# Patient Record
Sex: MALE | Race: WHITE | Employment: UNEMPLOYED | URBAN - METROPOLITAN AREA
[De-identification: names, ages, dates, MRNs, and addresses within clinical notes are randomized per-mention and may not be internally consistent; named-entity substitution may affect disease eponyms.]

---

## 2020-01-31 ENCOUNTER — TELEPHONE (OUTPATIENT)
Dept: PALLIATIVE MEDICINE | Facility: CLINIC | Age: 55
End: 2020-01-31

## 2020-01-31 NOTE — TELEPHONE ENCOUNTER
Order received from [facility] Mack Aguilar PA-C  for L5-S1 TLESI. Order scanned to telephone encounter, routing to scheduling coordinators to contact patient.     Marielena OLIVER    [facility]

## 2020-02-03 NOTE — TELEPHONE ENCOUNTER
"Pre-screening Questions for Radiology Injections:    Injection to be done at which interventional clinic site? [facility]    Instruct patient to arrive as directed prior to the scheduled appointment time:    [place] minutes before      [place]: 30 minutes before; if IV needed 1 hour before     Dr. Link-no IV needed for Cervical RALPH; please instruct to arrive 30\" early    Procedure ordered by Mack Aguilar    Procedure ordered? L5-S1 TLESI      Transforaminal Cervical RALPH - no pain provider currently performing    What insurance would patient like us to bill for this procedure? Medicare/ [employer]      Worker's comp or MVA (motor vehicle accident) -Any injection DO NOT SCHEDULE and route to Chantal Umana.      [employer] insurance - For SI joint injections, DO NOT SCHEDULE and route Chantal Umana.       [employer] - Any injection besides hip/shoulder/knee joint DO NOT SCHEDULE and route to Chantal Dong. She will obtain PA and call pt back to schedule procedure or notify pt of denial.       [employer]-Route to Chantal for review      **BCBS- ALL need to be routed to Chantal for review if a PA is needed**      IF SCHEDULING IN [place] AND NEEDS A PA, IT IS OKAY TO SCHEDULE. [place] HANDLES THEIR OWN PA'S AFTER THE PATIENT IS SCHEDULED. PLEASE SCHEDULE AT LEAST 1 WEEK OUT SO A PA CAN BE OBTAINED.    Any chance of pregnancy? Not Applicable   If YES, do NOT schedule and route to RN pool    Is an  needed? No     Patient has a drive home? (mandatory) YES: Informed    Is patient taking any blood thinners (i.e. plavix, coumadin, jantoven, warfarin, heparin, pradaxa or dabigatran, etc)? No   If hold needed, do NOT schedule, route to RN pool     Is patient taking any aspirin products (includes Excedrin and Fiorinal)? No     If more than 325mg/day do NOT schedule; route to RN pool     For CERVICAL procedures, hold all aspirin products for 6 days.     Tell pt that if aspirin product is not held for 6 days, the " procedure WILL BE cancelled.      Does the patient have a bleeding or clotting disorder? No     If YES, okay to schedule AND route to RN nurse pool    For any patients with platelet count <100, must be forwarded to provider    Is patient diabetic?  No  If YES, instruct them to bring their glucometer.    Does patient have an active infection or treated for one within the past week? No     Is patient currently taking any antibiotics?  No     For patients on chronic, preventative, or prophylactic antibiotics, procedures may be scheduled.     For patients on antibiotics for active or recent infection:antibiotic course must have been completed for 4 days    Is patient currently taking any steroid medications? (i.e. Prednisone, Medrol)  No     For patients on steroid medications, course must have been completed for 4 days    Reviewed with patient:  If you are started on any steroids or antibiotics between now and your appointment, you must contact us because the procedure may need to be cancelled.  Yes    Is patient actively being treated for cancer or immunocompromised? No  If YES, do NOT schedule and route to RN pool     Are you able to get on and off an exam table with minimal or no assistance? Yes  If NO, do NOT schedule and route to RN pool    Are you able to roll over and lay on your stomach with minimal or no assistance? Yes  If NO, do NOT schedule and route to RN pool     Any allergies to contrast dye, iodine, shellfish, or numbing and steroid medications? No  If YES, route to RN pool AND add allergy information to appointment notes    Allergies: Zosyn; Codeine sulfate; and Tramadol      Has the patient had a flu shot or any other vaccinations within 7 days before or after the procedure.  No     Does patient have an MRI/CT?  YES: 2020  Check Procedure Scheduling Grid to see if required.      Was the MRI done within the last 3 years?  Yes    If yes, where was the MRI done i.e.[facility], [facility], [facility], [facility] etc? [facility]    If no, do not schedule and route to RN pool    If MRI was not done at [facility], [facility] or [facility] do NOT schedule and route to RN pool.      If pt has an imaging disc, the injection MAY be scheduled but pt has to bring disc to appt.     If they show up without the disc the injection cannot be done    Reminders (please tell patient if applicable):       Instructed pt to arrive 30 minutes early for IV start if required. (Check Procedure Scheduling Grid)  Not Applicable      If celiac plexus block, informed patient NPO for 6 hours and that it is okay to take medications with sips of water, especially blood pressure medications  Not Applicable         If this is for a cervical procedure, informed patient that aspirin needs to be held for 6 days.   Not Applicable      For all patients not having spinal cord stimulator (SCS) trials or radiofrequency ablations (RFAs), informed patient:    IV sedation is not provided for this procedure.  If you feel that an oral anti-anxiety medication is needed, you can discuss this further with your referring provider or primary care provider.  The Pain Clinic provider will discuss specifics of what the procedure includes at your appointment.  Most procedures last 10-20 minutes.  We use numbing medications to help with any discomfort during the procedure.  Not Applicable      Do not schedule procedures requiring IV placement in the first appointment of the day or first appointment after lunch. Do NOT schedule at 0745, 0815 or 1245.       For patients 85 or older we recommend having an adult stay w/ them for the remainder of the day.       Does the patient have any questions?  NO  Marielena Latham  [facility]

## 2020-02-06 ENCOUNTER — RADIOLOGY INJECTION OFFICE VISIT (OUTPATIENT)
Dept: PALLIATIVE MEDICINE | Facility: CLINIC | Age: 55
End: 2020-02-06
Payer: MEDICARE

## 2020-02-06 ENCOUNTER — HOSPITAL ENCOUNTER (OUTPATIENT)
Dept: RADIOLOGY | Facility: CLINIC | Age: 55
Discharge: HOME | End: 2020-02-06
Attending: PSYCHIATRY & NEUROLOGY | Admitting: PSYCHIATRY & NEUROLOGY
Payer: MEDICARE

## 2020-02-06 VITALS — DIASTOLIC BLOOD PRESSURE: 89 MMHG | HEART RATE: 72 BPM | RESPIRATION RATE: 16 BRPM | SYSTOLIC BLOOD PRESSURE: 141 MMHG

## 2020-02-06 DIAGNOSIS — M54.16 LUMBAR RADICULOPATHY: Primary | ICD-10-CM

## 2020-02-06 DIAGNOSIS — M54.16 LUMBAR RADICULOPATHY: ICD-10-CM

## 2020-02-06 PROCEDURE — 27210202 XR LUMBAR SACRAL TRANSFORAMINAL INJ RIGHT: Mod: TC

## 2020-02-06 PROCEDURE — 25500064 ZZH RX 255 OP 636: Performed by: PSYCHIATRY & NEUROLOGY

## 2020-02-06 PROCEDURE — 64483 NJX AA&/STRD TFRM EPI L/S 1: CPT | Mod: RT | Performed by: PSYCHIATRY & NEUROLOGY

## 2020-02-06 PROCEDURE — 25000128 H RX IP 250 OP 636: Performed by: PSYCHIATRY & NEUROLOGY

## 2020-02-06 RX ORDER — DEXAMETHASONE SODIUM PHOSPHATE 10 MG/ML
10 INJECTION, SOLUTION INTRAMUSCULAR; INTRAVENOUS ONCE
Status: COMPLETED | OUTPATIENT
Start: 2020-02-06 | End: 2020-02-06

## 2020-02-06 RX ORDER — LIDOCAINE HYDROCHLORIDE 10 MG/ML
5 INJECTION, SOLUTION EPIDURAL; INFILTRATION; INTRACAUDAL; PERINEURAL ONCE
Status: COMPLETED | OUTPATIENT
Start: 2020-02-06 | End: 2020-02-06

## 2020-02-06 RX ORDER — GADOBUTROL 604.72 MG/ML
0.1 INJECTION INTRAVENOUS ONCE
Status: DISCONTINUED | OUTPATIENT
Start: 2020-02-06 | End: 2020-02-06 | Stop reason: CLARIF

## 2020-02-06 RX ORDER — BUPIVACAINE HYDROCHLORIDE 5 MG/ML
10 INJECTION, SOLUTION PERINEURAL ONCE
Status: COMPLETED | OUTPATIENT
Start: 2020-02-06 | End: 2020-02-06

## 2020-02-06 RX ADMIN — LIDOCAINE HYDROCHLORIDE 5 ML: 10 INJECTION, SOLUTION EPIDURAL; INFILTRATION; INTRACAUDAL; PERINEURAL at 08:48

## 2020-02-06 RX ADMIN — BUPIVACAINE HYDROCHLORIDE 1.5 ML: 5 INJECTION, SOLUTION EPIDURAL; INTRACAUDAL at 08:49

## 2020-02-06 RX ADMIN — DEXAMETHASONE SODIUM PHOSPHATE 10 MG: 10 INJECTION, SOLUTION INTRAMUSCULAR; INTRAVENOUS at 08:49

## 2020-02-06 RX ADMIN — IOHEXOL 4 ML: 300 INJECTION, SOLUTION INTRAVENOUS at 08:49

## 2020-02-06 NOTE — PROGRESS NOTES
Pre procedure Diagnosis: lumbar radiculopathy   Post procedure Diagnosis: Same  Procedure performed: right L5-S1 transforaminal epidural steroid injection   Anesthesia: none  Complications: none  Operators: Lori Francisco MD     Indications:   Otilio Jolly is a 54 year old male was sent by JAIRO Lozano for epidural steroid injection.  They have a history of low back and bilateral leg pain and numbness.  Exam shows diffuse weakness, with giveway in the LE, decreased sensation to LT bilaterally and they have tried conservative treatment including medications.    MRI was done at [facility] on 1/22/20 which showed   CONCLUSION: Multilevel degenerative lumbar spondylosis, unchanged right laminotomy at L4-5 and L5-S1 and the following notable findings:  1. New/recurrent 3 mm right posterolateral L4-5 and unchanged 2 mm central to left paracentral L4-5 disc protrusions with subarticular encroachment upon the traversing right L5 nerve root, but no central stenosis.  2. Unchanged, mildly caudally extending 2 to 3 mm right paracentral L3-4 disc extrusion without central stenosis or traversing neural compression.  3. No acute fracture or spondylolysis, although edematous degenerative endplate changes are demonstrated ventrally at L4-5.  4. Chronic mild bilateral L4-5 foraminal narrowing.  5. No evidence of arachnoidal adhesive disease    Options/alternatives, benefits and risks were discussed with the patient including bleeding, infection, tissue trauma, numbness, weakness, paralysis, spinal cord injury, radiation exposure, headache and reaction to medications. Questions were answered to his satisfaction and he agrees to proceed. Voluntary informed consent was obtained and signed.     Vitals were reviewed: Yes  Allergies were reviewed:  Yes   Medications were reviewed:  Yes   Pre-procedure pain score: 8/10    Procedure:  After getting informed consent, patient was brought into the procedure suite and was placed in a prone  position on the procedure table.   A Pause for the Cause was performed.  Patient was prepped and draped in sterile fashion.   After identifying the right L5 neuroforamen, the C-arm was rotated to a right lateral oblique angle.  A total of 5ml of Lidocaine 1% was used to anesthetize the skin and the needle track at a skin entry site coaxial with the fluoroscopy beam, and overriding the superior aspect of the neuroforamen.  A 22 gauge 5 inch spinal needle was advanced under intermittent fluoroscopy until it entered the foramen superiorly.    The position was then inspected from anteroposterior and lateral views, and the needle adjusted appropriately.  A total of 4ml of Omnipaque-300 was injected, confirming appropriate position, with spread into the nerve root sheath and the epidural space, with no intravascular uptake. 6ml was wasted    1.5ml of 0.5% bupivacaine with 10mg of dexamethasone was injected.  The needle was flushed with lidocaine and removed.    During the procedure, there was a paresthesia. Associated with pressure, improved with slowing.  Hemostasis was achieved, the area was cleaned, and bandaids were placed when appropriate.  The patient tolerated the procedure well, and was taken to the recovery room.    Images were saved to PACS.    Post-procedure pain score: 2/10  Follow-up includes:   -f/u phone call in one week  -f/u with referring provider    Lori Francisco MD  [facility]

## 2020-02-06 NOTE — DISCHARGE INSTRUCTIONS
[facility]   Procedure Discharge Instructions    Today you saw:    Dr. Preeti Francisco      You had a:  Right L5/S1 transforaminal epidural steroid injection    Medications used:  Lidocaine   Bupivacaine   Dexamethasone Omnipaque        Be cautious when walking. Numbness and/or weakness in the lower extremities may occur for up to 6-8 hours after the procedure due to effect of the local anesthetic    Do not drive for 6 hours. The effect of the local anesthetic could slow your reflexes.     You may resume your regular activities after 24 hours    Avoid strenuous activity for the first 24 hours    You may shower, however avoid swimming, tub baths or hot tubs for 24 hours following your procedure    You may have a mild to moderate increase in pain for several days following the injection.    It may take up to 14 days for the steroid medication to start working although you may feel the effect as early as a few days after the procedure.       You may use ice packs for 10-15 minutes, 3 to 4 times a day at the injection site for comfort    Do not use heat to painful areas for 6 to 8 hours. This will give the local anesthetic time to wear off and prevent you from accidentally burning your skin.     Unless you have been directed to avoid the use of anti-inflammatory medications (NSAIDS), you may use medications such as ibuprofen, Aleve or Tylenol for pain control if needed.     If you were fasting, you may resume your normal diet and medications after the procedure    Possible side effects of steroids that you may experience include flushing, elevated blood pressure, increased appetite, mild headaches and restlessness.  All of these symptoms will get better with time.    If you experience any of the following, call the Pain Clinic during work hours (Mon-Friday 8-4:30 pm) at [phone] or the Provider Line after hours at [phone]:  -Fever over 100 degree F  -Swelling, bleeding, redness,  drainage, warmth at the injection site  -Progressive weakness or numbness in your legs  -Loss of bowel or bladder function  -Unusual headache that is not relieved by Tylenol or other pain reliever  -Unusual new onset of pain that is not improving

## 2020-02-06 NOTE — IP AVS SNAPSHOT
MRN:[id number]                      After Visit Summary   2/6/2020    Otilio Jolly    MRN: [id number]           Visit Information        Provider Department      2/6/2020  8:45 AM [place]           Review of your medicines      UNREVIEWED medicines. Ask your doctor about these medicines       Dose / Directions   albuterol 108 (90 Base) MCG/ACT inhaler  Commonly known as:  PROAIR HFA/PROVENTIL HFA/VENTOLIN HFA      Dose:  2 puff  Inhale 2 puffs into the lungs every 3 hours as needed for shortness of breath / dyspnea.  Quantity:  1 Inhaler  Refills:  0              Protect others around you: Learn how to safely use, store and throw away your medicines at www.disposemymeds.org.       Follow-ups after your visit       Your next 10 appointments already scheduled    Feb 06, 2020  8:45 AM CST  Radiology Injections with Preeti Francisco MD  [facility] [place] ([facility]) [address]  [address]  [facility] [address]  [phone]      Feb 06, 2020  8:45 AM CST  XR LUMBAR/SACRAL TRANSFOR INJ BILATERAL with TASHIA  [facility] ([facility]) [address] [address]  [phone]   How do I prepare for my exam? (Food and drink instructions)  No Food and Drink Restrictions.    How do I prepare for my exam? (Other instructions)  * If you take Coumadin (or any other blood thinners) you may need to stop taking them up to 5 days prior to the exam. Talk to your doctor before stopping.  * If you take Plavix, Ticlid, Pletal or Persantine, please ask your doctor if you should stop these medicines. You may need extra tests on the morning of your scan.  * If you take Xarelto (Rivaroxaban), you may need to stop taking it 24 hours before treatment. Talk to your doctor before stopping this medicine.    What should I wear: Wear comfortable clothes.    How long does the  exam take: Injections take about 30 to 45 minutes. Most people spend up to 2 hours in the clinic or hospital.    What should I bring: Please bring any scans or X-rays taken at other hospitals, if similar tests were done. Also bring a list of your medicines, including vitamins, minerals and over-the-counter drugs. It is safest to leave personal items at home.    Do I need a : Plan to have someone drive you home afterward.    What do I need to tell my doctor:  Tell your doctor in advance:  * If you are allergic to X-ray dye (contrast fluid).  * If you may be pregnant.    What should I do after the exam: You may have slight cramping during this exam. The cramps should go away afterward. You may have some spotting after the exam.    What is this test: A spinal shot is done in or near the spine. You may receive steroid medicine (to reduce swelling) or contrast fluid (dye that makes the area show up more clearly on X-rays).  A nerve root shot is a shot into the nerve near the spine. It may reduce inflammation near the nerve root or spinal cord and reduce pain in the arm or leg.    Who should I call with questions: If you have any questions, please call the Imaging Department where you will have your exam. Directions, parking instructions, and other information are available on our website, [email or web address].        Care Instructions       Further instructions from your care team       [facility]   Procedure Discharge Instructions    Today you saw:    Dr. Preeti Francisco      You had a:  Right L5/S1 transforaminal epidural steroid injection    Medications used:  Lidocaine   Bupivacaine   Dexamethasone Omnipaque        Be cautious when walking. Numbness and/or weakness in the lower extremities may occur for up to 6-8 hours after the procedure due to effect of the local anesthetic    Do not drive for 6 hours. The effect of the local anesthetic could slow your reflexes.     You may  resume your regular activities after 24 hours    Avoid strenuous activity for the first 24 hours    You may shower, however avoid swimming, tub baths or hot tubs for 24 hours following your procedure    You may have a mild to moderate increase in pain for several days following the injection.    It may take up to 14 days for the steroid medication to start working although you may feel the effect as early as a few days after the procedure.       You may use ice packs for 10-15 minutes, 3 to 4 times a day at the injection site for comfort    Do not use heat to painful areas for 6 to 8 hours. This will give the local anesthetic time to wear off and prevent you from accidentally burning your skin.     Unless you have been directed to avoid the use of anti-inflammatory medications (NSAIDS), you may use medications such as ibuprofen, Aleve or Tylenol for pain control if needed.     If you were fasting, you may resume your normal diet and medications after the procedure    Possible side effects of steroids that you may experience include flushing, elevated blood pressure, increased appetite, mild headaches and restlessness.  All of these symptoms will get better with time.    If you experience any of the following, call the Pain Clinic during work hours (Mon-Friday 8-4:30 pm) at [phone] or the Provider Line after hours at [phone]:  -Fever over 100 degree F  -Swelling, bleeding, redness, drainage, warmth at the injection site  -Progressive weakness or numbness in your legs  -Loss of bowel or bladder function  -Unusual headache that is not relieved by Tylenol or other pain reliever  -Unusual new onset of pain that is not improving          Additional Information About Your Visit       Care EveryWhere ID    This is your Care EveryWhere ID. This could be used by other organizations to access your [facility] medical records  [id number]       Your Vitals Were  Most recent update: 2/6/2020  8:24 AM    Blood Pressure    126/94      Pulse   80    Respirations   16            Primary Care Provider Office Phone # Fax #    Oliver Duane Semmler, [phone]      Equal Access to Services    FARHANJAMIE PHILIP : Hadvilma josh munson kwesio Socharmaineali, waaxda luqadaha, qaybta kaalmada rosada, jessie quiles maheshrosibel caro laprudenciocorin keyon. So [facility] [phone].    ATENCIÓN: Si habla español, tiene a butler disposición servicios gratuitos de asistencia lingüística. Llame al [phone].    We comply with applicable federal and state civil rights laws, including the [place] Human Rights Act. We do not discriminate on the basis of race, color, creed, Hoahaoism, national origin, marital status, age, disability, sex, sexual orientation, or gender identity.       Thank you!    Thank you for choosing [facility] for your care. Our goal is always to provide you with excellent care. Hearing back from our patients is one way we can continue to improve our services. Please take a few minutes to complete the written survey that you may receive in the mail after you visit with us. Thank you!            Medication List      ASK your doctor about these medications          Morning Afternoon Evening Bedtime As Needed    albuterol 108 (90 Base) MCG/ACT inhaler  Also known as:  PROAIR HFA/PROVENTIL HFA/VENTOLIN HFA  INSTRUCTIONS:  Inhale 2 puffs into the lungs every 3 hours as needed for shortness of breath / dyspnea.

## 2020-02-06 NOTE — Clinical Note
How Severe Are Your Spot(S)?: mild Please send to referring provider Hpi Title: Evaluation of Skin Lesions

## 2020-02-13 ENCOUNTER — TELEPHONE (OUTPATIENT)
Dept: RADIOLOGY | Facility: CLINIC | Age: 55
End: 2020-02-13

## 2020-02-13 NOTE — TELEPHONE ENCOUNTER
Patient had a right L5-S1 transforaminal epidural steroid injection on 2/6/20.  Called patient for an update.      Left message that we were calling for an update about how s/he was doing after the injection.  LM that if s/he has any problems or questions to call the clinic at [phone].

## 2020-06-08 ENCOUNTER — TRANSFERRED RECORDS (OUTPATIENT)
Dept: HEALTH INFORMATION MANAGEMENT | Facility: CLINIC | Age: 55
End: 2020-06-08

## 2020-06-12 ENCOUNTER — MEDICAL CORRESPONDENCE (OUTPATIENT)
Dept: HEALTH INFORMATION MANAGEMENT | Facility: CLINIC | Age: 55
End: 2020-06-12

## 2020-06-12 ENCOUNTER — TRANSFERRED RECORDS (OUTPATIENT)
Dept: HEALTH INFORMATION MANAGEMENT | Facility: CLINIC | Age: 55
End: 2020-06-12

## 2020-06-15 ENCOUNTER — TELEPHONE (OUTPATIENT)
Dept: PALLIATIVE MEDICINE | Facility: CLINIC | Age: 55
End: 2020-06-15

## 2020-06-15 NOTE — TELEPHONE ENCOUNTER
Order received from Mack Aguilar PA-C at [facility] [place] [facility] for Right L5-S1 Tranforaminal Epidural Steroid Injection. Order scanned to telephone encounter, routing to scheduling coordinators to contact patient.    **Fax sent back requesting recent office notes.    Marielena OLIVER    [facility]

## 2020-06-15 NOTE — TELEPHONE ENCOUNTER
Office notes and and MRI report received.    Marielena OLIVER    [facility]

## 2020-07-31 ENCOUNTER — APPOINTMENT (OUTPATIENT)
Dept: ULTRASOUND IMAGING | Facility: CLINIC | Age: 55
End: 2020-07-31
Attending: EMERGENCY MEDICINE
Payer: MEDICARE

## 2020-07-31 ENCOUNTER — HOSPITAL ENCOUNTER (EMERGENCY)
Facility: CLINIC | Age: 55
Discharge: HOME | End: 2020-07-31
Attending: EMERGENCY MEDICINE | Admitting: EMERGENCY MEDICINE
Payer: MEDICARE

## 2020-07-31 VITALS
HEART RATE: 92 BPM | WEIGHT: 225 LBS | TEMPERATURE: 97.5 F | DIASTOLIC BLOOD PRESSURE: 103 MMHG | SYSTOLIC BLOOD PRESSURE: 169 MMHG | RESPIRATION RATE: 16 BRPM | OXYGEN SATURATION: 99 % | BODY MASS INDEX: 29.69 KG/M2

## 2020-07-31 DIAGNOSIS — T81.31XA WOUND DISRUPTION, POST-OP, SKIN, INITIAL ENCOUNTER: ICD-10-CM

## 2020-07-31 DIAGNOSIS — T81.31XA POSTOPERATIVE WOUND DEHISCENCE, INITIAL ENCOUNTER: ICD-10-CM

## 2020-07-31 PROCEDURE — 99284 EMERGENCY DEPT VISIT MOD MDM: CPT | Mod: Z6 | Performed by: EMERGENCY MEDICINE

## 2020-07-31 PROCEDURE — 99284 EMERGENCY DEPT VISIT MOD MDM: CPT | Mod: 25 | Performed by: EMERGENCY MEDICINE

## 2020-07-31 PROCEDURE — 76705 ECHO EXAM OF ABDOMEN: CPT

## 2020-07-31 RX ORDER — CEPHALEXIN 500 MG/1
500 TABLET ORAL 2 TIMES DAILY
Qty: 10 TABLET | Refills: 0 | Status: SHIPPED | OUTPATIENT
Start: 2020-07-31 | End: 2020-08-05

## 2020-07-31 ASSESSMENT — ENCOUNTER SYMPTOMS
HEMATOLOGIC/LYMPHATIC NEGATIVE: 1
EYES NEGATIVE: 1
GASTROINTESTINAL NEGATIVE: 1
MUSCULOSKELETAL NEGATIVE: 1
CONSTITUTIONAL NEGATIVE: 1
CARDIOVASCULAR NEGATIVE: 1
ALLERGIC/IMMUNOLOGIC NEGATIVE: 1
PSYCHIATRIC NEGATIVE: 1
ENDOCRINE NEGATIVE: 1
WOUND: 1
RESPIRATORY NEGATIVE: 1
NEUROLOGICAL NEGATIVE: 1

## 2020-07-31 NOTE — ED AVS SNAPSHOT
[facility] Emergency Department  [address] [address]  Phone:  [phone]  Fax:  [phone]                                    Otilio Jolly   MRN: [id number]    Department:  [facility] Emergency Department   Date of Visit:  7/31/2020           After Visit Summary Signature Page    I have received my discharge instructions, and my questions have been answered. I have discussed any challenges I see with this plan with the nurse or doctor.    ..........................................................................................................................................  Patient/Patient Representative Signature      ..........................................................................................................................................  Patient Representative Print Name and Relationship to Patient    ..................................................               ................................................  Date                                   Time    ..........................................................................................................................................  Reviewed by Signature/Title    ...................................................              ..............................................  Date                                               Time          22EPIC Rev 08/18

## 2020-07-31 NOTE — DISCHARGE INSTRUCTIONS
1) Your evaluation today suggests your wound opened up in the lower edge of your incision resulting in drainage.    2) Ultrasound showed a small fluid collection which is likely resulting in the drainage. The drainage is serosanguineous in color today and we have discussed that you are stable for discharge to home.     3) Be sure to change the dressing as instructed 3 times a day. Take oral antibiotics as prescribed over the next 5 days.  Be sure to call Dr. Kan Ballard review of visit in the department today and our plan of care particularly because you are about a week out of surgery.    4) we have discussed worrisome symptoms including fever, if you start draining pus out of your back, increasing pain or new concerns.

## 2020-07-31 NOTE — ED PROVIDER NOTES
History     Chief Complaint   Patient presents with     Post-op Problem     drainage from back incision     HPI  Otilio Jolly is a 55 year old male who presents for evaluation with concern about drainage. Patient is status post revision of a right L4-L5 decompression laminectomy with medial facetectomy and discectomy on July 23, 2020 at [facility] by Dr. Kan Ballard.  Patient reports he was doing well until this afternoon when he noted that his T-shirt was soaked and there is drainage about his wound.  He has no back pain, he reports no fever or chills.  Patient reports with drainage about his wound he presents for further care and evaluation.    Allergies:  Allergies   Allergen Reactions     Zosyn Rash     Codeine Sulfate Swelling     Tramadol      Swollen eyes       Problem List:    Patient Active Problem List    Diagnosis Date Noted     Abscess, tongue 07/16/2013     Priority: Medium        Past Medical History:    Past Medical History:   Diagnosis Date     Asthma        Past Surgical History:    Past Surgical History:   Procedure Laterality Date     INCISION AND DRAINAGE ORAL, COMBINED  7/16/2013    Procedure: COMBINED INCISION AND DRAINAGE ORAL;  Incision and drainage of base of tongue abcess, direct laryngoscopy;  Surgeon: Harley Ramires MD;  Location: [facility] OR     IRRIGATION AND DEBRIDEMENT ORAL, COMBINED  7/18/2013    Procedure: COMBINED IRRIGATION AND DEBRIDEMENT ORAL;  Irrigation and Debridement of Tongue Abscess;  Surgeon: Harley Ramires MD;  Location: [facility] OR     ORTHOPEDIC SURGERY         Family History:    No family history on file.    Social History:  Marital Status:   [4]  Social History     Tobacco Use     Smoking status: Current Every Day Smoker     Types: Cigarettes     Smokeless tobacco: Never Used   Substance Use Topics     Alcohol use: Yes     Alcohol/week: 5.0 standard drinks     Types: 6 Cans of beer per week     Comment: couple times a month     Drug use: No        Medications:     cephalexin 500 MG tablet  albuterol (PROAIR HFA, PROVENTIL HFA, VENTOLIN HFA) 108 (90 BASE) MCG/ACT inhaler          Review of Systems   Constitutional: Negative.    HENT: Negative.    Eyes: Negative.    Respiratory: Negative.    Cardiovascular: Negative.    Gastrointestinal: Negative.    Endocrine: Negative.    Genitourinary: Negative.    Musculoskeletal: Negative.    Skin: Positive for wound (back drainage).   Allergic/Immunologic: Negative.    Neurological: Negative.    Hematological: Negative.    Psychiatric/Behavioral: Negative.    All other systems reviewed and are negative.      Physical Exam   BP: (!) 169/103  Pulse: 92  Temp: 97.5  F (36.4  C)  Resp: 16  Weight: 102.1 kg (225 lb)  SpO2: 99 %      Physical Exam  HENT:      Head: Normocephalic and atraumatic.      Mouth/Throat:      Mouth: Mucous membranes are moist.   Eyes:      General: No scleral icterus.        Left eye: No discharge.      Extraocular Movements: Extraocular movements intact.      Pupils: Pupils are equal, round, and reactive to light.   Cardiovascular:      Rate and Rhythm: Normal rate.   Musculoskeletal:         General: No swelling, tenderness, deformity or signs of injury.      Lumbar back: Tenderness: drainage about the lower back/ sacral area post op.        Back:       Right lower leg: No edema.   Skin:     Capillary Refill: Capillary refill takes less than 2 seconds.          Neurological:      General: No focal deficit present.      Mental Status: He is alert and oriented to person, place, and time.   Psychiatric:         Mood and Affect: Mood normal.         Behavior: Behavior normal.         Thought Content: Thought content normal.         Judgment: Judgment normal.               ED Course        Procedures               Critical Care time:  none               ED medications: none    ED Vitals:  Vitals:    07/31/20 1248 07/31/20 1619   BP: (!) 169/103    Pulse: 92    Resp: 16    Temp: 97.5  F (36.4  C)    TempSrc: Oral     SpO2: 99%    Weight:  102.1 kg (225 lb)       ED labs and imaging:  Results for orders placed or performed during the hospital encounter of 07/31/20   US Abdomen Limited     Status: None (Preliminary result)    Narrative    US ABDOMEN LIMITED  7/31/2020 4:38 PM     HISTORY: Status post revision of a right L4-L5 decompression  laminectomy with medial facetectomy and discectomy on July 23, 2020 at  [place]. Wound dehiscence and drainage. Evaluate for seroma vs abscess.    COMPARISON: None      Impression    IMPRESSION: Tiny fluid collection in the area of leaking measuring 6 x  8 x 3 mm.        Assessments & Plan (with Medical Decision Making)   Clinical impression: 55-year-old male who presented with concern about drainage from his postop wound.  Patient is status post a revision of a right L4-L5 decompression laminectomy with medial facetectomy and discectomy on July 23, 2020.  Patient had recurrent right L4-5 lateral recess stenosis with neurogenic claudication.  Patient reported he suddenly had drainage about his back when he  felt his clothing was wet.  His exam is suspicious for wound dehiscence with postop seroma.  Small fluid collection noted on ultrasound examination not concerning for an abscess pocket.  He is discharged home after reviewing options to manage the wound.  We agreed to leave the wound open and allow it to drain spontaneously with a plan to complete dressing changes at least 3 times per day.  He is also advised to touch base with his treating surgeon.  He reported no trauma to his back he had no fever or chills.  On my exam he was pleasant in no acute distress he had no pain about his incision.  I removed some Steri-Strips about the lower edge of his incision and gentle palpation about the skin there was some drainage of serosanguineous fluid.  There was no soft tissue crepitance or warmth.  See photo in the physical exam section above for distribution of soft tissue changes about his  incision.  Photo was obtained after informed verbal consent from the patient.  Both patient and his partner are comfortable with plan of care.      ED course and Plan:  Reviewed the medical record.  Patient had procedure completed by Dr. Kan Ballard- [facility] on July 23. Soft tissue ultrasound was obtained to evaluate for seroma versus abscess pocket.  Ultrasound examination revealed tiny fluid collection area of leaking measuring 6 x 8 x 3 mm.  See additional details in the report above.  With no surrounding crepitance about the incision, no obvious significant fluid collection to suggest pus pocket, and no significant pain he is discharged home with plan to allow spontaneous drainage of his wound dehiscence at the lower edge as noted in the photo above. Patient is given empiric antibiotics cephalexin twice a day over the next 5 days.  Patient was advised that he should change the wound 3 times a day as reviewed with the patient and his partner.  We also reviewed that he touch base with his treating surgeon- (later today or over the weekend to notify the care team about his wound and his evaluation in the department).  We discussed and reviewed signs or symptoms that would be worrisome including fever, increasing pain or  drainage of pus, back swelling or any other new concerns.  Patient expressed comfort, understanding, and  agreement plan of care.      Disclaimer: This note consists of symbols derived from keyboarding, dictation and/or voice recognition software. As a result, there may be errors in the script that have gone undetected. Please consider this when interpreting information found in this chart.      I have reviewed the nursing notes.    I have reviewed the findings, diagnosis, plan and need for follow up with the patient.       Discharge Medication List as of 7/31/2020  5:35 PM      START taking these medications    Details   cephalexin 500 MG tablet Take 500 mg by mouth 2 times  daily for 5 days, Disp-10 tablet,R-0, E-Prescribe             Final diagnoses:   Postoperative wound dehiscence, initial encounter   Wound disruption, post-op, skin, initial encounter - Probable seroma with fluid collection measuring 8 x 6 x 3 mm       7/31/2020   [facility] EMERGENCY DEPARTMENT     Jose Jackson MD  07/31/20 5923

## 2021-01-01 ENCOUNTER — TELEPHONE (OUTPATIENT)
Dept: PALLIATIVE MEDICINE | Facility: CLINIC | Age: 56
End: 2021-01-01

## 2021-01-01 ENCOUNTER — TRANSCRIBE ORDERS (OUTPATIENT)
Dept: OTHER | Age: 56
End: 2021-01-01

## 2021-01-01 ENCOUNTER — RECORDS - HEALTHEAST (OUTPATIENT)
Dept: ADMINISTRATIVE | Facility: CLINIC | Age: 56
End: 2021-01-01

## 2021-01-01 ENCOUNTER — APPOINTMENT (OUTPATIENT)
Dept: GENERAL RADIOLOGY | Facility: CLINIC | Age: 56
End: 2021-01-01
Attending: FAMILY MEDICINE
Payer: MEDICARE

## 2021-01-01 ENCOUNTER — HOSPITAL ENCOUNTER (EMERGENCY)
Facility: CLINIC | Age: 56
Discharge: HOME | End: 2021-07-23
Attending: FAMILY MEDICINE | Admitting: FAMILY MEDICINE
Payer: MEDICARE

## 2021-01-01 ENCOUNTER — HOSPITAL ENCOUNTER (EMERGENCY)
Facility: CLINIC | Age: 56
Discharge: HOME | End: 2021-12-10
Attending: EMERGENCY MEDICINE | Admitting: EMERGENCY MEDICINE
Payer: MEDICARE

## 2021-01-01 ENCOUNTER — HOSPITAL ENCOUNTER (OUTPATIENT)
Dept: PALLIATIVE MEDICINE | Facility: CLINIC | Age: 56
Discharge: HOME | End: 2021-10-14
Attending: PHYSICIAN ASSISTANT | Admitting: STUDENT IN AN ORGANIZED HEALTH CARE EDUCATION/TRAINING PROGRAM
Payer: MEDICARE

## 2021-01-01 ENCOUNTER — HOSPITAL ENCOUNTER (EMERGENCY)
Facility: CLINIC | Age: 56
Discharge: HOME | End: 2021-06-19
Attending: FAMILY MEDICINE | Admitting: FAMILY MEDICINE
Payer: MEDICARE

## 2021-01-01 ENCOUNTER — LAB (OUTPATIENT)
Dept: FAMILY MEDICINE | Facility: CLINIC | Age: 56
End: 2021-01-01
Payer: MEDICARE

## 2021-01-01 ENCOUNTER — APPOINTMENT (OUTPATIENT)
Dept: CT IMAGING | Facility: CLINIC | Age: 56
End: 2021-01-01
Attending: FAMILY MEDICINE
Payer: MEDICARE

## 2021-01-01 VITALS
WEIGHT: 210 LBS | HEART RATE: 91 BPM | RESPIRATION RATE: 18 BRPM | DIASTOLIC BLOOD PRESSURE: 76 MMHG | BODY MASS INDEX: 28.44 KG/M2 | HEIGHT: 72 IN | OXYGEN SATURATION: 96 % | SYSTOLIC BLOOD PRESSURE: 124 MMHG | TEMPERATURE: 98 F

## 2021-01-01 VITALS — SYSTOLIC BLOOD PRESSURE: 136 MMHG | RESPIRATION RATE: 16 BRPM | DIASTOLIC BLOOD PRESSURE: 92 MMHG | HEART RATE: 102 BPM

## 2021-01-01 VITALS
SYSTOLIC BLOOD PRESSURE: 131 MMHG | RESPIRATION RATE: 16 BRPM | WEIGHT: 220 LBS | TEMPERATURE: 96.1 F | DIASTOLIC BLOOD PRESSURE: 111 MMHG | OXYGEN SATURATION: 97 % | BODY MASS INDEX: 29.8 KG/M2 | HEIGHT: 72 IN | HEART RATE: 106 BPM

## 2021-01-01 VITALS
TEMPERATURE: 98.2 F | RESPIRATION RATE: 16 BRPM | BODY MASS INDEX: 28.21 KG/M2 | OXYGEN SATURATION: 96 % | DIASTOLIC BLOOD PRESSURE: 76 MMHG | HEART RATE: 71 BPM | WEIGHT: 208 LBS | SYSTOLIC BLOOD PRESSURE: 101 MMHG

## 2021-01-01 DIAGNOSIS — M54.16 LUMBAR RADICULOPATHY: ICD-10-CM

## 2021-01-01 DIAGNOSIS — G89.29 ACUTE EXACERBATION OF CHRONIC LOW BACK PAIN: ICD-10-CM

## 2021-01-01 DIAGNOSIS — Z20.822 ENCOUNTER FOR LABORATORY TESTING FOR COVID-19 VIRUS: Primary | ICD-10-CM

## 2021-01-01 DIAGNOSIS — Z20.822 ENCOUNTER FOR LABORATORY TESTING FOR COVID-19 VIRUS: ICD-10-CM

## 2021-01-01 DIAGNOSIS — J02.9 ACUTE PHARYNGITIS, UNSPECIFIED ETIOLOGY: ICD-10-CM

## 2021-01-01 DIAGNOSIS — M51.26 HERNIATED NUCLEUS PULPOSUS, LUMBAR: ICD-10-CM

## 2021-01-01 DIAGNOSIS — M51.26 HERNIATED NUCLEUS PULPOSUS, LUMBAR: Primary | ICD-10-CM

## 2021-01-01 DIAGNOSIS — M54.50 ACUTE EXACERBATION OF CHRONIC LOW BACK PAIN: ICD-10-CM

## 2021-01-01 LAB
ANION GAP SERPL CALCULATED.3IONS-SCNC: 6 MMOL/L (ref 3–14)
BASOPHILS # BLD AUTO: 0.1 10E9/L (ref 0–0.2)
BASOPHILS NFR BLD AUTO: 0.8 %
BUN SERPL-MCNC: 15 MG/DL (ref 7–30)
CALCIUM SERPL-MCNC: 9.6 MG/DL (ref 8.5–10.1)
CHLORIDE SERPL-SCNC: 105 MMOL/L (ref 94–109)
CO2 SERPL-SCNC: 27 MMOL/L (ref 20–32)
CREAT SERPL-MCNC: 1.05 MG/DL (ref 0.66–1.25)
DIFFERENTIAL METHOD BLD: NORMAL
EOSINOPHIL # BLD AUTO: 0 10E9/L (ref 0–0.7)
EOSINOPHIL NFR BLD AUTO: 0.3 %
ERYTHROCYTE [DISTWIDTH] IN BLOOD BY AUTOMATED COUNT: 12 % (ref 10–15)
GFR SERPL CREATININE-BSD FRML MDRD: 78 ML/MIN/{1.73_M2}
GLUCOSE SERPL-MCNC: 102 MG/DL (ref 70–99)
HCT VFR BLD AUTO: 47.7 % (ref 40–53)
HGB BLD-MCNC: 16.2 G/DL (ref 13.3–17.7)
IMM GRANULOCYTES # BLD: 0 10E9/L (ref 0–0.4)
IMM GRANULOCYTES NFR BLD: 0.4 %
LYMPHOCYTES # BLD AUTO: 2 10E9/L (ref 0.8–5.3)
LYMPHOCYTES NFR BLD AUTO: 19.4 %
MCH RBC QN AUTO: 31.9 PG (ref 26.5–33)
MCHC RBC AUTO-ENTMCNC: 34 G/DL (ref 31.5–36.5)
MCV RBC AUTO: 94 FL (ref 78–100)
MONOCYTES # BLD AUTO: 0.9 10E9/L (ref 0–1.3)
MONOCYTES NFR BLD AUTO: 8.4 %
NEUTROPHILS # BLD AUTO: 7.2 10E9/L (ref 1.6–8.3)
NEUTROPHILS NFR BLD AUTO: 70.7 %
NRBC # BLD AUTO: 0 10*3/UL
NRBC BLD AUTO-RTO: 0 /100
PLATELET # BLD AUTO: 222 10E9/L (ref 150–450)
PLATELET # BLD EST: NORMAL 10*3/UL
POTASSIUM SERPL-SCNC: 4 MMOL/L (ref 3.4–5.3)
RBC # BLD AUTO: 5.08 10E12/L (ref 4.4–5.9)
RBC MORPH BLD: NORMAL
SARS-COV-2 RNA RESP QL NAA+PROBE: NEGATIVE
SODIUM SERPL-SCNC: 138 MMOL/L (ref 133–144)
VARIANT LYMPHS BLD QL SMEAR: PRESENT
WBC # BLD AUTO: 10.2 10E9/L (ref 4–11)

## 2021-01-01 PROCEDURE — 99284 EMERGENCY DEPT VISIT MOD MDM: CPT | Performed by: EMERGENCY MEDICINE

## 2021-01-01 PROCEDURE — 64483 NJX AA&/STRD TFRM EPI L/S 1: CPT | Mod: RT | Performed by: STUDENT IN AN ORGANIZED HEALTH CARE EDUCATION/TRAINING PROGRAM

## 2021-01-01 PROCEDURE — 250N000009 HC RX 250: Performed by: STUDENT IN AN ORGANIZED HEALTH CARE EDUCATION/TRAINING PROGRAM

## 2021-01-01 PROCEDURE — 99284 EMERGENCY DEPT VISIT MOD MDM: CPT | Performed by: FAMILY MEDICINE

## 2021-01-01 PROCEDURE — 85025 COMPLETE CBC W/AUTO DIFF WBC: CPT | Performed by: FAMILY MEDICINE

## 2021-01-01 PROCEDURE — 71046 X-RAY EXAM CHEST 2 VIEWS: CPT

## 2021-01-01 PROCEDURE — 250N000011 HC RX IP 250 OP 636: Performed by: FAMILY MEDICINE

## 2021-01-01 PROCEDURE — 250N000009 HC RX 250: Performed by: FAMILY MEDICINE

## 2021-01-01 PROCEDURE — 70491 CT SOFT TISSUE NECK W/DYE: CPT

## 2021-01-01 PROCEDURE — 80048 BASIC METABOLIC PNL TOTAL CA: CPT | Performed by: FAMILY MEDICINE

## 2021-01-01 PROCEDURE — 99283 EMERGENCY DEPT VISIT LOW MDM: CPT | Performed by: EMERGENCY MEDICINE

## 2021-01-01 PROCEDURE — 99285 EMERGENCY DEPT VISIT HI MDM: CPT | Mod: 25 | Performed by: FAMILY MEDICINE

## 2021-01-01 PROCEDURE — U0005 INFEC AGEN DETEC AMPLI PROBE: HCPCS

## 2021-01-01 PROCEDURE — 99282 EMERGENCY DEPT VISIT SF MDM: CPT | Performed by: FAMILY MEDICINE

## 2021-01-01 PROCEDURE — U0003 INFECTIOUS AGENT DETECTION BY NUCLEIC ACID (DNA OR RNA); SEVERE ACUTE RESPIRATORY SYNDROME CORONAVIRUS 2 (SARS-COV-2) (CORONAVIRUS DISEASE [COVID-19]), AMPLIFIED PROBE TECHNIQUE, MAKING USE OF HIGH THROUGHPUT TECHNOLOGIES AS DESCRIBED BY CMS-2020-01-R: HCPCS

## 2021-01-01 PROCEDURE — 255N000002 HC RX 255 OP 636: Performed by: STUDENT IN AN ORGANIZED HEALTH CARE EDUCATION/TRAINING PROGRAM

## 2021-01-01 PROCEDURE — 250N000011 HC RX IP 250 OP 636: Performed by: STUDENT IN AN ORGANIZED HEALTH CARE EDUCATION/TRAINING PROGRAM

## 2021-01-01 RX ORDER — OXYCODONE HYDROCHLORIDE 5 MG/1
5 TABLET ORAL EVERY 6 HOURS PRN
Qty: 12 TABLET | Refills: 0 | Status: SHIPPED | OUTPATIENT
Start: 2021-01-01

## 2021-01-01 RX ORDER — BUPIVACAINE HYDROCHLORIDE 2.5 MG/ML
10 INJECTION, SOLUTION INFILTRATION; PERINEURAL ONCE
Status: COMPLETED | OUTPATIENT
Start: 2021-01-01 | End: 2021-01-01

## 2021-01-01 RX ORDER — IOPAMIDOL 755 MG/ML
80 INJECTION, SOLUTION INTRAVASCULAR ONCE
Status: COMPLETED | OUTPATIENT
Start: 2021-01-01 | End: 2021-01-01

## 2021-01-01 RX ORDER — DEXAMETHASONE SODIUM PHOSPHATE 10 MG/ML
10 INJECTION, SOLUTION INTRAMUSCULAR; INTRAVENOUS ONCE
Status: COMPLETED | OUTPATIENT
Start: 2021-01-01 | End: 2021-01-01

## 2021-01-01 RX ORDER — IOPAMIDOL 408 MG/ML
10 INJECTION, SOLUTION INTRATHECAL ONCE
Status: COMPLETED | OUTPATIENT
Start: 2021-01-01 | End: 2021-01-01

## 2021-01-01 RX ORDER — PREDNISONE 20 MG/1
TABLET ORAL
Qty: 10 TABLET | Refills: 0 | Status: SHIPPED | OUTPATIENT
Start: 2021-01-01

## 2021-01-01 RX ADMIN — IOPAMIDOL 80 ML: 755 INJECTION, SOLUTION INTRAVENOUS at 11:38

## 2021-01-01 RX ADMIN — DEXAMETHASONE SODIUM PHOSPHATE 10 MG: 10 INJECTION, SOLUTION INTRAMUSCULAR; INTRAVENOUS at 14:55

## 2021-01-01 RX ADMIN — LIDOCAINE HYDROCHLORIDE 1 ML: 10 INJECTION, SOLUTION EPIDURAL; INFILTRATION; INTRACAUDAL; PERINEURAL at 14:54

## 2021-01-01 RX ADMIN — BUPIVACAINE HYDROCHLORIDE 1 ML: 2.5 INJECTION, SOLUTION EPIDURAL; INFILTRATION; INTRACAUDAL at 14:54

## 2021-01-01 RX ADMIN — SODIUM CHLORIDE 80 ML: 9 INJECTION, SOLUTION INTRAVENOUS at 11:38

## 2021-01-01 RX ADMIN — IOPAMIDOL 1 ML: 408 INJECTION, SOLUTION INTRATHECAL at 14:55

## 2021-01-01 ASSESSMENT — MIFFLIN-ST. JEOR
SCORE: 1815.55
SCORE: 1860.91

## 2021-06-19 NOTE — ED NOTES
Cough started 2 days ago. Productive cough, white slimy mucous. Sore throat started 2 days ago. No fever. occ SOA.  Smoker. Hx of severe infection in throat 7 years ago.

## 2021-06-19 NOTE — ED TRIAGE NOTES
"Sore throat and productive cough worse over the last couple days; states last time he felt like this he was in a coma for a month...\"some weird infection\"  "

## 2021-06-19 NOTE — DISCHARGE INSTRUCTIONS
Return to be seen if you are not able to open your mouth, not able to swallow, have breathing difficulty, muffled voice, increased pain you cannot control with over-the-counter medication, or other concerning symptoms.  Augmentin 875 mg twice daily for 7 days.  Take ibuprofen 400 mg 4 times daily if needed for pain.

## 2021-06-19 NOTE — ED PROVIDER NOTES
"  History     Chief Complaint   Patient presents with     Pharyngitis     worse over the last couple days; states last time he felt like this he was in a coma for a month...\"some weird infection\"     HPI    Otilio Jolly is a 56 year old male who comes in with a sore throat and cough.  This initially began with a productive cough.  Then proceeded to sore throat.  He took 2 ibuprofen for it this morning.  He is not feeling short of breath.  He is a heavy smoker at least a pack a day.  He is somewhat evasive about his alcohol use but says that he probably had too much last night.  He has no intentions to quit drinking.  He has not had Covid infection.  He has had Covid vaccination.  He is treated for high blood pressure.  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  He says he was in a coma for a month but his medical record indicates he was hospitalized July 16-26, 10 days.  He was intubated for 4 of those days.  He says he is not having difficulty swallowing, breathing, or opening his mouth.    Past medical history through care everywhere from the [facility] system:  \"Allergies  Reconcile with Patient's Chart  Active Allergy Reactions Severity Noted Date Comments   Acetaminophen GI Upset   07/23/2020     Codeine Stomach Upset Low 08/20/2008     Unlisted Allergen (Include Detail In Comments) Hives   03/26/2015 Zosyn??? Patient unsure of the name of the medication.      Tramadol Edema   06/29/2009 Swollen eyes       Medications  Reconcile with Patient's Chart  Medication Sig Dispensed Refills Start Date End Date Status   aspirin enteric coated 81 mg tablet   Take 1 tablet by mouth once daily with a meal.   0 04/15/2014   Active   ibuprofen (ADVIL; MOTRIN) 200 mg tablet   Take 1 tablet by mouth 4 times daily if needed.   0 03/14/2016   Active   cyclobenzaprine (FLEXERIL) 5 mg tablet    Indications: Low back pain, " unspecified back pain laterality, unspecified chronicity, unspecified whether sciatica present Take 1 tablet by mouth 3 times daily if needed for Muscle Spasm. 30 tablet   1 03/17/2020   Active   HYDROcodone-acetaminophen, 5-325 mg, (NORCO) per tablet    Indications: Low back pain, unspecified back pain laterality, unspecified chronicity, unspecified whether sciatica present Take 1 tablet by mouth every 4 hours if needed for Pain Max acetaminophen dose: 4000 mg in 24 hrs. 20 tablet   0 05/07/2020   Active   gabapentin (NEURONTIN) 300 mg capsule    Indications: Low back pain, unspecified back pain laterality, unspecified chronicity, unspecified whether sciatica present Take 1 capsule by mouth 3 times daily. 90 capsule   1 06/09/2020   Active   oxyCODONE (ROXICODONE) 5 mg immediate release tablet    Indications: Herniated nucleus pulposus, L4-5 Take 1-2 tablets by mouth every 4 hours if needed for Pain Use oxycodone sparingly for breakthrough pain. 15 tablet   0 07/23/2020   Active   amLODIPine (NORVASC) 2.5 mg tablet    Indications: HTN (hypertension) TAKE 1 TABLET BY MOUTH DAILY 30 Tablet   0 06/15/2021   Active   lisinopriL (PRINIVIL; ZESTRIL) 40 mg tablet    Indications: HTN (hypertension) TAKE 1 TABLET BY MOUTH DAILY 30 Tablet   0 06/15/2021   Active   lisinopriL (PRINIVIL; ZESTRIL) 40 mg tablet    Indications: HTN (hypertension) TAKE 1 TABLET BY MOUTH DAILY 30 Tablet   0 05/10/2021 06/15/2021 Discontinued   amLODIPine (NORVASC) 2.5 mg tablet    Indications: HTN (hypertension) TAKE 1 TABLET BY MOUTH DAILY 30 Tablet   0 05/10/2021 06/15/2021 Discontinued     Active Problems  Reconcile with Patient's Chart  Problem Noted Date   Herniated nucleus pulposus, L4-5 right 07/23/2020   Leg pain 09/28/2008   Overview:     Formatting of this note might be different from the original.  Affects lower extremities     Neuromuscular disorder 09/28/2008   Overview:     Formatting of this note might be different from the  "original.  Familial?     Neuralgia, neuritis, and radiculitis, unspecified 09/28/2008   Tobacco use disorder 09/18/2008   ED (erectile dysfunction) 09/18/2008   Back pain    \"      Allergies:  Allergies   Allergen Reactions     Zosyn Rash     Codeine Sulfate Swelling     Tramadol      Swollen eyes       Problem List:    Patient Active Problem List    Diagnosis Date Noted     Abscess, tongue 07/16/2013     Priority: Medium        Past Medical History:    Past Medical History:   Diagnosis Date     Asthma        Past Surgical History:    Past Surgical History:   Procedure Laterality Date     INCISION AND DRAINAGE ORAL, COMBINED  7/16/2013    Procedure: COMBINED INCISION AND DRAINAGE ORAL;  Incision and drainage of base of tongue abcess, direct laryngoscopy;  Surgeon: Harley Ramires MD;  Location: [facility] OR     IRRIGATION AND DEBRIDEMENT ORAL, COMBINED  7/18/2013    Procedure: COMBINED IRRIGATION AND DEBRIDEMENT ORAL;  Irrigation and Debridement of Tongue Abscess;  Surgeon: Harley Ramires MD;  Location: [facility] OR     ORTHOPEDIC SURGERY         Family History:    No family history on file.    Social History:  Marital Status:   [4]  Social History     Tobacco Use     Smoking status: Current Every Day Smoker     Types: Cigarettes     Smokeless tobacco: Never Used   Substance Use Topics     Alcohol use: Yes     Alcohol/week: 5.0 standard drinks     Types: 6 Cans of beer per week     Comment: couple times a month     Drug use: No        Medications:    amoxicillin-clavulanate (AUGMENTIN) 875-125 MG tablet  albuterol (PROAIR HFA, PROVENTIL HFA, VENTOLIN HFA) 108 (90 BASE) MCG/ACT inhaler          Review of Systems  All other systems are reviewed and are negative    Physical Exam   BP: (!) 150/95  Pulse: 106  Temp: 96.1  F (35.6  C)  Resp: 16  Height: 182.9 cm (6')  Weight: 99.8 kg (220 lb)  SpO2: 97 %      Physical Exam    Nursing note and vitals were reviewed.  Constitutional: Awake and alert, adequately nourished and developed " appearing 56-year-old in moderate discomfort, who appears moderately ill, and who answers questions appropriately and cooperates with examination.  Intermittently he exhibits a twitch where he will suddenly seem to startle.  When I asked him about this he says it is due to the bright light.  He said it is not due to pain in his throat.  HEENT: Voice quality is normal.  No trismus is present.  There is diffuse erythema of the oropharynx without swelling of the posterior oropharynx.  Gingiva is intact.  Overall dentition is poor with tobacco stained teeth but no obvious caries.  PERRL EOMI.   Neck: Freely mobile.  There is tender submandibular adenopathy but no discrete mass in the right submandibular space.  No meningismus  Cardiovascular: Cardiac examination reveals normal heart rate and regular rhythm without murmur.  Pulmonary/Chest: Breathing is unlabored.  Breath sounds are clear and equal bilaterally.  There no retractions, tachypnea, rales, wheezes, or rhonchi.  Neurological: Alert, oriented, thought content logical, coherent   Skin: Warm, dry, no rashes.  Psychiatric: Affect broad and appropriate.    ED Course        Procedures               Critical Care time:  none               Results for orders placed or performed during the hospital encounter of 06/19/21 (from the past 24 hour(s))   CBC with platelets differential   Result Value Ref Range    WBC 10.2 4.0 - 11.0 10e9/L    RBC Count 5.08 4.4 - 5.9 10e12/L    Hemoglobin 16.2 13.3 - 17.7 g/dL    Hematocrit 47.7 40.0 - 53.0 %    MCV 94 78 - 100 fl    MCH 31.9 26.5 - 33.0 pg    MCHC 34.0 31.5 - 36.5 g/dL    RDW 12.0 10.0 - 15.0 %    Platelet Count 222 150 - 450 10e9/L    Diff Method PENDING    Basic metabolic panel   Result Value Ref Range    Sodium 138 133 - 144 mmol/L    Potassium 4.0 3.4 - 5.3 mmol/L    Chloride 105 94 - 109 mmol/L    Carbon Dioxide 27 20 - 32 mmol/L    Anion Gap 6 3 - 14 mmol/L    Glucose 102 (H) 70 - 99 mg/dL    Urea Nitrogen 15 7 - 30  mg/dL    Creatinine 1.05 0.66 - 1.25 mg/dL    GFR Estimate 78 >60 mL/min/[1.73_m2]    GFR Estimate If Black >90 >60 mL/min/[1.73_m2]    Calcium 9.6 8.5 - 10.1 mg/dL   Soft tissue neck CT w contrast    Narrative    CT OF THE NECK WITH CONTRAST   6/19/2021 11:51 AM     COMPARISON: Neck CT 7/23/2013    HISTORY: Neck pain. Clinical concern of Josue's angina.    TECHNIQUE:  Axial CT images of the neck were acquired after the  intravenous administration of 80 mL Isovue 370 nonionic iodinated  contrast material. Coronal reconstructions were created.    FINDINGS: Mildly enlarged and moderately hyperenhancing lingual  tonsils. Associated fat stranding lateral to the right thyroid  cartilage. Findings have a reactive appearance. Normal appearance of  the palatine and adenoid tonsils. The larynx is unremarkable. The  parapharyngeal and  spaces are unremarkable. The oral cavity  and floor of mouth structures are symmetrical and unremarkable. The  parotid and submandibular glands are symmetrical and unremarkable.  Small bilateral cervical lymph nodes are noted; none are pathologic by  size or morphology criteria.     Vascular structures of the neck are widely patent. The thyroid gland  is grossly unremarkable.     The included orbital and intracranial compartments are unremarkable.  The visualized portions of the paranasal sinuses are clear. The  mastoid air cells and middle ear cavities are clear. The cervical  spine is unremarkable for age. Mild paraseptal emphysematous change.      Impression    IMPRESSION:   1.  Interval development of mild enlargement and hyperenhancement of  the lingual tonsils with mild fat stranding lateral to the right  thyroid cartilage.  2.  Findings have a reactive appearance.  3.  No abscess.  4.  The previous noted inflammatory changes within the tongue have  resolved.    Radiation dose for this scan was reduced using automated exposure  control, adjustment of the mA and/or kV according  to patient size, or  iterative reconstruction technique.    SINDHU DURAN MD   XR Chest 2 Views    Narrative    CHEST TWO VIEWS  6/19/2021 11:52 AM     HISTORY:  Cough, dyspnea.    COMPARISON: 1/11/2014    FINDINGS: Heart size and pulmonary vascularity are within normal  limits. The lungs are clear. No pneumothorax or pleural effusion.       Impression    IMPRESSION: No radiographic evidence of acute chest abnormality.     KRISTINA RUSSELL MD       Medications   iopamidol (ISOVUE-370) solution 80 mL (80 mLs Intravenous Given 6/19/21 1138)   sodium chloride 0.9 % bag 500mL for CT scan flush use (80 mLs Intravenous Given 6/19/21 1138)       Assessments & Plan (with Medical Decision Making)     56-year-old male comes in with sore throat and cough as described above.  He has a past history of Franchesca's angina resulting in hospitalization and need for incision and drainage as well as intubation 13 years ago.  He consumes significant amounts of alcohol and is a heavy smoker.  Discal examination was significant for mild hypertension and mild tachycardia but he had no trismus, no voice changes, and an unremarkable oropharyngeal exam.  Given his comorbidities and prior history of Franchesca's angina, he had CT scan of the neck.  This showed inflammation of the lingual tonsils.  I suspect this is more of a viral pharyngitis but given his past history and poor dentition and his comorbidities, I have recommended he take Augmentin 875 mg twice daily for 7 days.  He is agreeable to this plan.  He will return to be seen if he is developing fevers, swallowing difficulty, inability to open the mouth, speech difficulty, increased pain he cannot control with over-the-counter medication, or other concerning symptoms.  He and his wife expressed understanding and their questions were answered.    I have reviewed the nursing notes.    I have reviewed the findings, diagnosis, plan and need for follow up with the patient.       New  Prescriptions    AMOXICILLIN-CLAVULANATE (AUGMENTIN) 875-125 MG TABLET    Take 1 tablet by mouth 2 times daily for 7 days       Final diagnoses:   Acute pharyngitis, unspecified etiology       6/19/2021   [facility] EMERGENCY DEPT     Vj Sosa MD  06/19/21 4787

## 2021-07-23 NOTE — ED NOTES
Pt presents via EMS for eval of back pain.  Pt states that he has a hx of multiple back surgeries.  He began having back pain 3 days ago- no known new injury. Pt has pain in the lower, mid back. This radiates all the way down his right leg.  Pt called 911 due to the pain.  EMS administered a total of 10mg of morphine and 1 mg of ativan

## 2021-07-23 NOTE — DISCHARGE INSTRUCTIONS
Avoid aggravating activities, such as heavy lifting, pushing, pulling.    Do gentle stretching of your back.    Use ibuprofen 400-600 mg up to 4 times per day if needed for pain. Stop if it is causing nausea or abdominal pain.   Add acetaminophen 500 mg 1-2 pills up to every 4 hours if needed for pain.   You may add oxycodone 5 mg, 1-2 tablets up to every 6 hours if needed for pain.  Try to use this primarily only at night to help with sleep.    Do not use alcohol, operate machinery, drive, or climb on ladders for 8 hours after taking oxycodone. Use docusate (100mg) 2 times a day to prevent constipation while on narcotics.  You can call today to schedule physical therapy or give this a few more days and see how it responds next week.  Return if you are not able to manage the pain or if you develop new concerning symptoms such as weakness in the extremities, a change of sensation in your groin or perineum, or loss of control of your bowels or bladder.

## 2021-07-23 NOTE — ED PROVIDER NOTES
History     Chief Complaint   Patient presents with     Back Pain     HPI    Otilio Jolly is a 56 year old male who presents via EMS with back pain.  He called them this morning because he had pain to severe to get out of bed.  He received 10 mg of morphine and 1 mg of lorazepam during transport and is now heavily sedated but his pain is improved.  He says his back pain flared up 3 days ago.  He has a history of chronic low back pain but does not use any chronic narcotic therapy.  I reviewed his record and the [place] prescription drug monitoring program he has had no narcotics identified there for the last year.  The pain is in the lower back and radiates to the right leg down to the toes.  It is aggravated by movement.  He has not had any fevers with this.  He does not inject drugs intravenously.  He says he is use no alcohol for 3 days.  He continues to smoke heavily.  He has not had any change in perineal sensation.  He has not had any alteration of bowel or bladder function.  He is treated for hypertension.  He is taken Advil for his pain and his last dose was in the early morning hours.  He has not had Covid infection.  He has had Covid vaccination.    Allergies:  Allergies   Allergen Reactions     Zosyn Rash     Codeine Sulfate Swelling     Tramadol      Swollen eyes       Problem List:    Patient Active Problem List    Diagnosis Date Noted     Abscess, tongue 07/16/2013     Priority: Medium        Past Medical History:    Past Medical History:   Diagnosis Date     Asthma        Past Surgical History:    Past Surgical History:   Procedure Laterality Date     INCISION AND DRAINAGE ORAL, COMBINED  7/16/2013    Procedure: COMBINED INCISION AND DRAINAGE ORAL;  Incision and drainage of base of tongue abcess, direct laryngoscopy;  Surgeon: Harley Ramires MD;  Location: [facility] OR     IRRIGATION AND DEBRIDEMENT ORAL, COMBINED  7/18/2013    Procedure: COMBINED IRRIGATION AND DEBRIDEMENT ORAL;  Irrigation and  Debridement of Tongue Abscess;  Surgeon: Harley Ramires MD;  Location: [facility] OR     ORTHOPEDIC SURGERY         Family History:    No family history on file.    Social History:  Marital Status:   [4]  Social History     Tobacco Use     Smoking status: Current Every Day Smoker     Types: Cigarettes     Smokeless tobacco: Never Used   Substance Use Topics     Alcohol use: Yes     Alcohol/week: 5.0 standard drinks     Types: 6 Cans of beer per week     Comment: couple times a month     Drug use: No        Medications:    oxyCODONE (ROXICODONE) 5 MG tablet  albuterol (PROAIR HFA, PROVENTIL HFA, VENTOLIN HFA) 108 (90 BASE) MCG/ACT inhaler        Review of Systems    All other systems are reviewed and are negative    Physical Exam   BP: 114/84  Pulse: 67  Temp: 98.2  F (36.8  C)  Resp: 14  Weight: 94.3 kg (208 lb)  SpO2: 98 %      Physical Exam    Nursing note and vitals were reviewed.  Constitutional: Somnolent but arousable, adequately nourished and developed appearing 56-year-old in moderate discomfort, who does not appear acutely ill, and who answers questions appropriately but slowly and cooperates with examination.  HEENT: EOMI.   Neck: Freely mobile.  Cardiovascular: Cardiac examination reveals normal heart rate and regular rhythm without murmur.  Pulmonary/Chest: Breathing is unlabored.  Breath sounds are clear and equal bilaterally.  There no retractions, tachypnea, rales, wheezes, or rhonchi.  Abdomen: Soft, nontender, no HSM or masses rebound or guarding.  Musculoskeletal: Extremities are warm and well-perfused and without edema  Neurological: Alert, oriented, thought content logical, coherent.  Motor strength intact in the lower extremities on manual muscle testing.  Skin: Warm, dry, no rashes.  Psychiatric: Affect broad and appropriate.      ED Course        Procedures              Critical Care time:  none               No results found for this or any previous visit (from the past 24  hour(s)).    Medications - No data to display    Assessments & Plan (with Medical Decision Making)     56-year-old male with a history of chronic low back pain who had back surgery about a year ago, his second surgery, presented with an exacerbation of pain over the past 3 days without a clear inciting event.  There are no red flag signs or symptoms.  He does not use drugs intravenously.  He has not been having fevers.  He does not have any change in perineal sensation or change in bowel or bladder function.  He has no symptoms or signs of motor weakness.  He had improvement in his symptoms with parenteral narcotic therapy given in the EMS rig.  At this time I recommended conservative therapy with anti-inflammatories and acetaminophen using oxycodone only for pain not otherwise controlled primarily at night to help with sleep and for short-term use.  Review of PDMP shows no chronic use.  He can consider scheduling physical therapy now or wait a few days and see if things will settle down.  He should return if he cannot manage the pain or develops motor weakness, fevers, or other worrisome symptoms.    I have reviewed the nursing notes.    I have reviewed the findings, diagnosis, plan and need for follow up with the patient.       New Prescriptions    OXYCODONE (ROXICODONE) 5 MG TABLET    Take 1 tablet (5 mg) by mouth every 6 hours as needed for pain       Final diagnoses:   Acute exacerbation of chronic low back pain       7/23/2021   [facility] EMERGENCY DEPT     Vj Sosa MD  07/23/21 2136

## 2021-10-05 NOTE — TELEPHONE ENCOUNTER
COVID test ordered    Penny Mcelroy RN, BSN, CMSRN  Care Coordinator  [facility] [place]

## 2021-10-05 NOTE — TELEPHONE ENCOUNTER
Screening Questions for Radiology Injections:    Injection to be done at which interventional clinic site? [facility]    If [facility] location, tell patient that this procedure requires a COVID-19 lab test be done within 4 days of the procedure. Would you still like to move forward with scheduling the procedure?  YES: Informed    If YES, let patient know that someone will call them to schedule the COVID-19 test and that they will only receive a call back if the result is positive. Route to nursing to enter order.     Instruct patient to arrive as directed prior to the scheduled appointment time:    [place] minutes before      [place]: 30 minutes before; if IV needed 1 hour before     Procedure ordered by     Procedure ordered? Right L5-S1 transforaminal      Transforaminal Cervical RALPH -  [place] does NOT have providers that do these- [facility] and [facility] do have providers that do    As a reminder, receiving steroids can decrease your body's ability to fight infection.   Would you still like to move forward with scheduling the injection?  Yes    What insurance would patient like us to bill for this procedure? Medicare       Worker's comp or MVA (motor vehicle accident) -Any injection DO NOT SCHEDULE and route to Chantal Umana.      [employer] insurance - For SI joint injections, DO NOT SCHEDULE and route Chantal Umana.       ALL BCBS, [employer] and [employer]-Route to Chantal for review DO NOT SCHEDULE      IF SCHEDULING IN [place] AND NEEDS A PA, IT IS OKAY TO SCHEDULE. [place] HANDLES THEIR OWN PA'S AFTER THE PATIENT IS SCHEDULED. PLEASE SCHEDULE AT LEAST 1 WEEK OUT SO A PA CAN BE OBTAINED.    Any chance of pregnancy? Not Applicable   If YES, do NOT schedule and route to RN pool    Is an  needed? No     Patient has a drive home? (mandatory) YES: informed     Is patient taking any blood thinners (i.e. plavix, coumadin, jantoven, warfarin, heparin, pradaxa or dabigatran, etc)? No    If hold needed, do NOT schedule, route to RN pool     Is patient taking any aspirin products (includes Excedrin and Fiorinal)? No     If more than 325mg/day, OK to schedule; Instruct pt to decrease to less than 325 mg for 7 days AND route to RN pool    For CERVICAL procedures, hold all aspirin products for 6 days.     Tell pt that if aspirin product is not held for 6 days, the procedure WILL BE cancelled.      Does the patient have a bleeding or clotting disorder? No     If YES, okay to schedule AND route to RN nurse pool    For any patients with platelet count <100, must be forwarded to provider    Any allergies to contrast dye, iodine, shellfish, or numbing and steroid medications? No    If YES, add allergy information to appointment notes AND route to the RN pool     If RALPH and Contrast Dye / Iodine Allergy? DO NOT SCHEDULE, route to RN pool    Allergies: Zosyn, Codeine sulfate, and Tramadol     Is patient diabetic?  No  If YES, instruct them to bring their glucometer.    Does patient have an active infection or treated for one within the past week? No     Is patient currently taking any antibiotics?  No     For patients on chronic, preventative, or prophylactic antibiotics, procedures may be scheduled.     For patients on antibiotics for active or recent infection:antibiotic course must have been completed for 4 days    Is patient currently taking any steroid medications? (i.e. PrednisonMedrol)  No    For patients on steroid medications, course must have been completed for 4 days    Is patient actively being treated for cancer or immunocompromised? No  If YES, do NOT schedule and route to RN pool     Are you able to get on and off an exam table with minimal or no assistance? Yes  If NO, do NOT schedule and route to RN pool    Are you able to roll over and lay on your stomach with minimal or no assistance? Yes  If NO, do NOT schedule and route to RN pool     Has the patient had a flu shot or any other  vaccinations within 7 days before or after the procedure.  No     Have you recently had a COVID vaccine or have plans to get it in the near future? No    If yes, explain that for the vaccine to work best they need to:       wait 1 week before and 1 week after getting Vaccine #1    wait 1 week before and 2 weeks after getting Vaccine #2    wait 1 week before and 2 weeks after getting Vaccine BOOSTER    If patient has concerns about the timing, send to RN pool     Does patient have an MRI/CT?  YES: 2020  Check Procedure Scheduling Grid to see if required.      Was the MRI done within the last 3 years?  Yes    If yes, [facility], [facility], [facility] etc? [facility]       If no, do not schedule and route to RN pool    If MRI was not done at [facility], [facility] or [facility] do NOT schedule and route to RN pool.      If pt has an imaging disc, the injection MAY be scheduled but pt has to bring disc to appt.     If they show up without the disc the injection cannot be done    Procedure Specific Instructions:      If celiac plexus block, informed patient NPO for 6 hours and that it is okay to take medications with sips of water, especially blood pressure medications  Not Applicable         If this is for a cervical procedure, informed patient that aspirin needs to be held for 6 days.   Not Applicable      If IV needed:    Do not schedule procedures requiring IV placement in the first appointment of the day or first appointment after lunch. Do NOT schedule at 0745, 0815 or 1245.     Instructed pt to arrive 30 minutes early for IV start if required. (Check Procedure Scheduling Grid)  Not Applicable    Reminders:      If you are started on any steroids or antibiotics between now and your appointment, you must contact us because the procedure may need to be cancelled.  No      For all procedures except radiofrequency ablations (RFAs) and spinal cord stimulator (SCS) trials, informed  patient:    IV sedation is not provided for this procedure.  If you feel that an oral anti-anxiety medication is needed, you can discuss this further with your referring provider or primary care provider.  The Pain Clinic provider will discuss specifics of what the procedure includes at your appointment.  Most procedures last 10-20 minutes.  We use numbing medications to help with any discomfort during the procedure.  Not Applicable      For patients 85 or older we recommend having an adult stay w/ them for the remainder of the day.       Does the patient have any questions?  NO  Su Alonzo  [facility]

## 2021-10-14 NOTE — PROGRESS NOTES
Pre-procedure Intake    Have you been fasting? NA    If yes, for how long?      Are you taking a prescribed blood thinner such as coumadin, Plavix, Xarelto?    No     If yes, when did you take your last dose? , If not held inform nurse or , directly    Do you take aspirin?  No    If cervical procedure, have you held aspirin for 6 days?   NA, If not held inform  or  Directly. If it's a cervical procedure     Do you have any allergies to contrast dye, iodine, steroid and/or numbing medications?  NO, if YES, inform  or  directly    Are you currently taking antibiotics or have an active infection?  NO, If YES, inform  or  directly    Have you had a fever/elevated temperature within the past week? NO, if YES, inform  or  directly    Are you currently taking oral steroids? NO, if YES, inform  or  directly    Do you have a ? Yes, If NO, inform  or  directly     Are you pregnant or breastfeeding?  Not Applicable, if YES, inform nurse or  directly    Have you received the COVID-19 vaccine? Yes    If yes, was it your 1st, 2nd or only dose needed? 2nd    Date of most recent vaccine: 6/21    Notify provider and RNs if systolic BP >170, diastolic BP >100, P >100 or O2 sats < 90%, Notify Nurse or  Directly if out of range

## 2021-10-14 NOTE — PROGRESS NOTES
"Pre procedure Diagnosis: lumbar radiculopathy   Post procedure Diagnosis: Same  Procedure performed: RIght L5 transforaminal epidural steroid injection   Anesthesia: none  Complications: none  Operators: Zoie Nicholas MD     Needle: 25g 5\" spinal  Injectate: 1ml 0.25% bupivacaine, 10mg of dexamethasone    Indications:   Otilio Jolly is a 56 year old male was sent by Mack Aguilar PA-C for Right L5 TFESI.  he has a history of right sided low back and buttock pain, worse with sitting or standing. He also has intermittent numbness of the lateral aspect of his right foot.  Exam shows antalgic gait and he has tried conservative treatment including medications, surgery.    MRI was done on 6/8/2020 which showed     Options/alternatives, benefits and risks were discussed with the patient including bleeding, infection, tissue trauma, numbness, weakness, paralysis, spinal cord injury, radiation exposure, headache and reaction to medications. Questions were answered to his satisfaction and he agrees to proceed. Voluntary informed consent was obtained and signed.     Vitals were reviewed: Yes  Allergies were reviewed:  Yes   Medications were reviewed:  Yes   Pre-procedure pain score: 5 in back, 3 in leg/10    Procedure:  After getting informed consent, patient was brought into the procedure suite and was placed in a prone position on the procedure table.   A Pause for the Cause was performed.  Patient was prepped and draped in sterile fashion.     After identifying the right L5 neuroforamen, the C-arm was rotated to a right lateral oblique angle.  A total of 3ml of Lidocaine 1% was used to anesthetize the skin and the needle track at a skin entry site coaxial with the fluoroscopy beam, and overriding the superior aspect of the neuroforamen. Then, a 25 gauge 5 inch spinal needle was placed coaxial with the fluoroscopy beam and overriding the superior aspect of the neuroforamen. The spinal needle was advanced under " intermittent fluoroscopy until it entered the foramen superiorly.    The position was then inspected from anteroposterior and lateral views, and the needle adjusted appropriately.  A total of 1ml of Isovue 200 was injected, confirming appropriate position, with spread into the epidural space, with no intravascular uptake. 9ml was wasted    1ml of 0.25% bupivacaine and 10mg of dexamethasone were injected from separate syringes. The tubing was flushed with contrast dye between administering injectates.  The needle was removed.    During the procedure, there was not a paresthesia.   Hemostasis was achieved, the area was cleaned, and bandaids were placed when appropriate.  The patient tolerated the procedure well, and was taken to the recovery room.    Images were saved to PACS.    Post-procedure pain score: 1 in the back, 0 in the leg/10  Follow-up includes:   -f/u with referring provider    Zoie Nicholas MD  [facility]

## 2021-10-14 NOTE — DISCHARGE INSTRUCTIONS
[facility]   Procedure Discharge Instructions    Today you saw:         Dr. Zoie Nicholas    You had an: RIght L5 transforaminal epidural steroid injection      Medications used:  Lidocaine   Bupivacaine   Dexamethasone  IsoVue             Be cautious when walking. Numbness and/or weakness in the lower extremities may occur for up to 6-8 hours after the procedure due to effect of the local anesthetic    Do not drive for 6 hours. The effect of the local anesthetic could slow your reflexes.     You may resume your regular activities after 24 hours    Avoid strenuous activity for the first 24 hours    You may shower, however avoid swimming, tub baths or hot tubs for 24 hours following your procedure    You may have a mild to moderate increase in pain for several days following the injection.    It may take up to 14 days for the steroid medication to start working although you may feel the effect as early as a few days after the procedure.       You may use ice packs for 10-15 minutes, 3 to 4 times a day at the injection site for comfort    Do not use heat to painful areas for 6 to 8 hours. This will give the local anesthetic time to wear off and prevent you from accidentally burning your skin.     Unless you have been directed to avoid the use of anti-inflammatory medications (NSAIDS), you may use medications such as ibuprofen, Aleve or Tylenol for pain control if needed.     If you were fasting, you may resume your normal diet and medications after the procedure    Possible side effects of steroids that you may experience include flushing, elevated blood pressure, increased appetite, mild headaches and restlessness.  All of these symptoms will get better with time.    If you experience any of the following, call the Pain Clinic during work hours (Mon-Friday 8-4:30 pm) at [phone] or the Provider Line after hours at [phone]:  -Fever over 100 degree F  -Swelling, bleeding,  redness, drainage, warmth at the injection site  -Progressive weakness or numbness in your legs   -Loss of bowel or bladder function  -Unusual headache that is not relieved by Tylenol or other pain reliever  -Unusual new onset of pain that is not improving

## 2021-12-10 NOTE — DISCHARGE INSTRUCTIONS
Take your oxycodone as prescribed for pain, you may take ibuprofen as well    Activity as tolerated, no lifting bending or twisting    Call surgery clinic if symptoms progress    Return here for weakness, loss of control of bowel or bladder, fever or other concern.    5 days prednisone as prescribed.

## 2021-12-10 NOTE — ED PROVIDER NOTES
History     Chief Complaint   Patient presents with     Numbness     right leg numbness, hx of back surgery in november. drank about a pint of alcohol before coming in     HPI  Otilio Jolly is a 56 year old male who presents by ambulance from home secondary to numbness and pain in the right buttock radiating down the posterior right leg.  Pain is described as moderate, worse with certain movements.  The numbness is persistent has been ongoing for months.  He underwent decompressive surgery early November at [facility].  He followed up with surgeon 2 weeks ago per his report telling him that he had persistent numbness and the pain was worse.  He has been using oxycodone sporadically, reports last dose 4 days ago.  Using ibuprofen as well with minimal relief.  He denies loss control of bowel or bladder, denies saddle anesthesia, denies weakness although does have giveaway weakness with severe pain at times.  This is his third low back surgery, no hardware.  Denies fever.  Drank about a pint of alcohol the day prior to calling the ambulance.  Tells me that his girlfriend became upset that his symptoms were worse.  He has not contacted his surgery clinic in the past 2 weeks.    Allergies:  Allergies   Allergen Reactions     Zosyn Rash     Acetaminophen      Other reaction(s): GI Upset     Codeine Sulfate Swelling     Tramadol      Swollen eyes       Problem List:    Patient Active Problem List    Diagnosis Date Noted     Abscess, tongue 07/16/2013     Priority: Medium        Past Medical History:    Past Medical History:   Diagnosis Date     Asthma        Past Surgical History:    Past Surgical History:   Procedure Laterality Date     INCISION AND DRAINAGE ORAL, COMBINED  7/16/2013    Procedure: COMBINED INCISION AND DRAINAGE ORAL;  Incision and drainage of base of tongue abcess, direct laryngoscopy;  Surgeon: Harley Ramires MD;  Location: [facility] OR     IRRIGATION AND DEBRIDEMENT ORAL, COMBINED   7/18/2013    Procedure: COMBINED IRRIGATION AND DEBRIDEMENT ORAL;  Irrigation and Debridement of Tongue Abscess;  Surgeon: Harley Ramires MD;  Location: [facility] OR     ORTHOPEDIC SURGERY         Family History:    No family history on file.    Social History:  Marital Status:   [4]  Social History     Tobacco Use     Smoking status: Current Every Day Smoker     Types: Cigarettes     Smokeless tobacco: Never Used   Substance Use Topics     Alcohol use: Yes     Alcohol/week: 5.0 standard drinks     Types: 6 Cans of beer per week     Comment: couple times a month     Drug use: No        Medications:    predniSONE (DELTASONE) 20 MG tablet  albuterol (PROAIR HFA, PROVENTIL HFA, VENTOLIN HFA) 108 (90 BASE) MCG/ACT inhaler  oxyCODONE (ROXICODONE) 5 MG tablet          Review of Systems  All other systems reviewed and are negative.  Denies low back pain at the site of surgery.  Physical Exam   BP: 124/76  Pulse: 91  Temp: 98  F (36.7  C)  Resp: 18  Height: 182.9 cm (6')  Weight: 95.3 kg (210 lb)  SpO2: 96 %      Physical Exam  Nontoxic-appearing no respiratory distress alert and oriented, appears clinically mildly intoxicated, mild slurring of speech, inappropriate laughter  Head atraumatic normocephalic  Strength lower extremities 5/5 and symmetrical, sensation diminished to light touch describes tingling to light touch in the right lower extremity.  His lumbar incision is clean and dry without induration warmth redness or purulent discharge, no associated tenderness.  ED Course                 Procedures              Critical Care time:  none               No results found for this or any previous visit (from the past 24 hour(s)).    Medications - No data to display    Assessments & Plan (with Medical Decision Making)  56-year-old male 3 weeks plus out from decompressive lumbar surgery presents with acute on chronic right lower extremity pain and numbness.  Strength is intact, no red flags per history or exam.  Recommend  prednisone x5 days.  He can take his oxycodone and ibuprofen.  Call surgery clinic regarding current complaint.  Return criteria reviewed.  Safe for discharge home at this time.     I have reviewed the nursing notes.    I have reviewed the findings, diagnosis, plan and need for follow up with the patient.       New Prescriptions    PREDNISONE (DELTASONE) 20 MG TABLET    Take two tablets (= 40mg) each day for 5 (five) days       Final diagnoses:   Lumbar radiculopathy       12/10/2021   [facility] EMERGENCY DEPT     Corby Gibson MD  12/10/21 8909

## 2022-08-24 NOTE — TELEPHONE ENCOUNTER
Patient called returning a call, patient declined service          Su Alonzo    [facility]      numerical 0-10

## (undated) RX ORDER — BUPIVACAINE HYDROCHLORIDE 2.5 MG/ML
INJECTION, SOLUTION EPIDURAL; INFILTRATION; INTRACAUDAL
Status: DISPENSED
Start: 2021-01-01

## (undated) RX ORDER — DEXAMETHASONE SODIUM PHOSPHATE 10 MG/ML
INJECTION, SOLUTION INTRAMUSCULAR; INTRAVENOUS
Status: DISPENSED
Start: 2021-01-01

## (undated) RX ORDER — LIDOCAINE HYDROCHLORIDE 10 MG/ML
INJECTION, SOLUTION EPIDURAL; INFILTRATION; INTRACAUDAL; PERINEURAL
Status: DISPENSED
Start: 2021-01-01

## (undated) RX ORDER — BUPIVACAINE HYDROCHLORIDE 5 MG/ML
INJECTION, SOLUTION EPIDURAL; INTRACAUDAL
Status: DISPENSED
Start: 2020-02-06

## (undated) RX ORDER — DEXAMETHASONE SODIUM PHOSPHATE 10 MG/ML
INJECTION, SOLUTION INTRAMUSCULAR; INTRAVENOUS
Status: DISPENSED
Start: 2020-02-06